# Patient Record
Sex: FEMALE | Race: WHITE | NOT HISPANIC OR LATINO
[De-identification: names, ages, dates, MRNs, and addresses within clinical notes are randomized per-mention and may not be internally consistent; named-entity substitution may affect disease eponyms.]

---

## 2017-08-02 PROBLEM — Z00.00 ENCOUNTER FOR PREVENTIVE HEALTH EXAMINATION: Status: ACTIVE | Noted: 2017-08-02

## 2020-04-26 ENCOUNTER — MESSAGE (OUTPATIENT)
Age: 25
End: 2020-04-26

## 2020-05-23 ENCOUNTER — APPOINTMENT (OUTPATIENT)
Dept: DISASTER EMERGENCY | Facility: CLINIC | Age: 25
End: 2020-05-23

## 2020-05-23 LAB
SARS-COV-2 IGG SERPL IA-ACNC: 0.1 INDEX
SARS-COV-2 IGG SERPL QL IA: NEGATIVE

## 2021-12-07 ENCOUNTER — APPOINTMENT (OUTPATIENT)
Dept: PSYCHIATRY | Facility: CLINIC | Age: 26
End: 2021-12-07
Payer: COMMERCIAL

## 2021-12-07 PROCEDURE — 99205 OFFICE O/P NEW HI 60 MIN: CPT

## 2021-12-08 ENCOUNTER — TRANSCRIPTION ENCOUNTER (OUTPATIENT)
Age: 26
End: 2021-12-08

## 2022-02-22 ENCOUNTER — APPOINTMENT (OUTPATIENT)
Dept: PSYCHIATRY | Facility: CLINIC | Age: 27
End: 2022-02-22
Payer: COMMERCIAL

## 2022-02-22 PROCEDURE — 99214 OFFICE O/P EST MOD 30 MIN: CPT

## 2022-03-08 ENCOUNTER — APPOINTMENT (OUTPATIENT)
Dept: PSYCHIATRY | Facility: CLINIC | Age: 27
End: 2022-03-08
Payer: COMMERCIAL

## 2022-03-08 PROCEDURE — 99214 OFFICE O/P EST MOD 30 MIN: CPT

## 2022-06-07 ENCOUNTER — APPOINTMENT (OUTPATIENT)
Dept: PSYCHIATRY | Facility: CLINIC | Age: 27
End: 2022-06-07
Payer: COMMERCIAL

## 2022-06-08 ENCOUNTER — APPOINTMENT (OUTPATIENT)
Dept: PSYCHIATRY | Facility: CLINIC | Age: 27
End: 2022-06-08
Payer: COMMERCIAL

## 2022-06-08 PROCEDURE — 99214 OFFICE O/P EST MOD 30 MIN: CPT | Mod: 95

## 2022-09-13 ENCOUNTER — APPOINTMENT (OUTPATIENT)
Dept: PSYCHIATRY | Facility: CLINIC | Age: 27
End: 2022-09-13

## 2022-09-13 DIAGNOSIS — F90.9 ATTENTION-DEFICIT HYPERACTIVITY DISORDER, UNSPECIFIED TYPE: ICD-10-CM

## 2022-09-13 DIAGNOSIS — F41.0 PANIC DISORDER [EPISODIC PAROXYSMAL ANXIETY]: ICD-10-CM

## 2022-09-13 DIAGNOSIS — F41.1 GENERALIZED ANXIETY DISORDER: ICD-10-CM

## 2022-09-13 PROCEDURE — 99214 OFFICE O/P EST MOD 30 MIN: CPT | Mod: 95

## 2022-11-15 ENCOUNTER — APPOINTMENT (OUTPATIENT)
Dept: PSYCHIATRY | Facility: CLINIC | Age: 27
End: 2022-11-15

## 2023-02-02 ENCOUNTER — APPOINTMENT (OUTPATIENT)
Dept: PSYCHIATRY | Facility: CLINIC | Age: 28
End: 2023-02-02

## 2023-09-12 ENCOUNTER — APPOINTMENT (OUTPATIENT)
Dept: RHEUMATOLOGY | Facility: CLINIC | Age: 28
End: 2023-09-12
Payer: COMMERCIAL

## 2023-09-12 VITALS
WEIGHT: 144 LBS | OXYGEN SATURATION: 98 % | HEIGHT: 64 IN | HEART RATE: 73 BPM | SYSTOLIC BLOOD PRESSURE: 98 MMHG | DIASTOLIC BLOOD PRESSURE: 60 MMHG | BODY MASS INDEX: 24.59 KG/M2 | TEMPERATURE: 97.2 F

## 2023-09-12 DIAGNOSIS — M79.10 MYALGIA, UNSPECIFIED SITE: ICD-10-CM

## 2023-09-12 PROCEDURE — 99204 OFFICE O/P NEW MOD 45 MIN: CPT

## 2023-09-12 RX ORDER — SERTRALINE HYDROCHLORIDE 100 MG/1
100 TABLET, FILM COATED ORAL
Qty: 30 | Refills: 2 | Status: DISCONTINUED | COMMUNITY
Start: 2021-12-07 | End: 2023-09-12

## 2023-09-12 RX ORDER — TIZANIDINE 4 MG/1
4 TABLET ORAL
Qty: 30 | Refills: 0 | Status: ACTIVE | COMMUNITY
Start: 2023-09-12 | End: 1900-01-01

## 2023-11-08 ENCOUNTER — APPOINTMENT (OUTPATIENT)
Dept: RHEUMATOLOGY | Facility: CLINIC | Age: 28
End: 2023-11-08

## 2023-11-27 ENCOUNTER — APPOINTMENT (OUTPATIENT)
Dept: PSYCHIATRY | Facility: CLINIC | Age: 28
End: 2023-11-27
Payer: COMMERCIAL

## 2023-11-27 PROCEDURE — 99215 OFFICE O/P EST HI 40 MIN: CPT

## 2023-11-27 RX ORDER — DEXTROAMPHETAMINE SACCHARATE, AMPHETAMINE ASPARTATE, DEXTROAMPHETAMINE SULFATE AND AMPHETAMINE SULFATE 2.5; 2.5; 2.5; 2.5 MG/1; MG/1; MG/1; MG/1
10 TABLET ORAL
Qty: 30 | Refills: 0 | Status: COMPLETED | COMMUNITY
Start: 2022-03-08 | End: 2023-11-27

## 2023-11-27 RX ORDER — ESCITALOPRAM OXALATE 5 MG/1
5 TABLET ORAL DAILY
Qty: 5 | Refills: 0 | Status: ACTIVE | COMMUNITY
Start: 2023-11-27 | End: 1900-01-01

## 2023-11-27 RX ORDER — CLONAZEPAM 0.5 MG/1
0.5 TABLET ORAL DAILY
Qty: 15 | Refills: 0 | Status: COMPLETED | COMMUNITY
Start: 2021-12-07 | End: 2023-11-27

## 2023-11-27 RX ORDER — ESCITALOPRAM OXALATE 10 MG/1
10 TABLET ORAL
Qty: 30 | Refills: 0 | Status: ACTIVE | COMMUNITY
Start: 2023-11-27 | End: 1900-01-01

## 2023-12-20 ENCOUNTER — APPOINTMENT (OUTPATIENT)
Dept: PSYCHIATRY | Facility: CLINIC | Age: 28
End: 2023-12-20

## 2024-02-07 ENCOUNTER — APPOINTMENT (OUTPATIENT)
Dept: PSYCHIATRY | Facility: CLINIC | Age: 29
End: 2024-02-07
Payer: COMMERCIAL

## 2024-02-07 PROCEDURE — 99214 OFFICE O/P EST MOD 30 MIN: CPT

## 2024-02-07 NOTE — SOCIAL HISTORY
[With Family] : lives with family [Employed] : employed [College] : College [None] : none [FreeTextEntry3] : Single [FreeTextEntry1] : Social History: \par  Born and raised: in [place], [place] She is the oldest of 2 sibling\par  Siblings: 1 younger brother and one younger sister\par  Parents: were good providers, , Intact family\par  Childhood: was good\par  Education: She remembers  being on academic probation in middle school. She was very involved in school sports and could not play sports at the time. In HS she remembers being very restless, could not sit in one place for a long time. "I would get up saying I wanted to go to the bathroom." Pt state she could not sit long in one place.  Her parents thought she was lazy and did not want to study. This made her very frustrated. Teachers complained but did not ask to see a doctor.  She could not finish test on time and asked for extra time but was not given. She was happy when she was outside playing sports. . 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 He has been very caring and supportive. \par  \par  Legal Hx: Denies\par  	\par  Access to firearms: Denies\par

## 2024-02-07 NOTE — DISCUSSION/SUMMARY
[FreeTextEntry1] : Assessment: Patient is a 27 yo female with h/o SHAWANDA, and ADHD seen today for medication management. Patient is compliant with the medications, tolerating it well without any side effects. I-STOP was checked without any problems.   PLAN: D/C Lexapro 5 mg for 5 days and then increase it to 10 mg PO QAM for depression and anxiety Continue Adderall XR 10 mg for ADHD Start Adderall 5 mg IR PO QD as a booster - Discussed risks and benefits of medications including side effects of GI and sexual with SSRI. Alternative strategies including no intervention discussed with patient. Patient consents to current medications as prescribed. - Discussed with patient regarding importance of abstinence and sobriety from alcohol and drugs. Educated about relationship between worsening mood/anxiety symptoms and drug use and improvement of symptoms with abstinence.  - Discussed about unpredictable effects including cardiorespiratory collapse from the combination of illicit drugs and prescribed medications. Patient verbalized understanding. - Patient understands to contact clinic prn with concerns and agrees to call 911 or go to nearest ER if symptoms worsen. - Next appointment was made by the patient in 6-8 weeks Patient was not in any distress.   I spent a total of 30 minutes for today's visit in evaluating and treating the patient as per above, including: preparing for patient's appointment (review of prior documents, [facility] ), performing a medically necessary exam/evaluation, communicating/counseling/educating the patient ordering medications

## 2024-02-07 NOTE — PHYSICAL EXAM
[FreeTextEntry5] : fidgety  [de-identified] : poor [FreeTextEntry8] : ok [None] : none [Cooperative] : cooperative [Anxious] : anxious [Flat] : flat [Clear] : clear [Linear/Goal Directed] : linear/goal directed [Average] : average [WNL] : within normal limits

## 2024-02-07 NOTE — FAMILY HISTORY
[FreeTextEntry1] : Family History:\par  \par  Psychiatric: None\par  Substance use: None\par  Suicide: None\par  Neurological/medical: None\par  Autoimmune disorders: None\par  Dementia: None\par  Cancer: None\par  Metabolic: None\par  Epilepsy: None\par  Migraine: None\par  Brain aneurysms: None\par  Movement disorders: None\par  Thyroid: Mom had thyroid CA\par  Cardiac: None\par

## 2024-02-07 NOTE — PAST MEDICAL HISTORY
[FreeTextEntry1] : Past Psyc Hx: Pt  neurologist started her on Zoloft for anxiety last October. Her irritability has improved, continues to feel anxious. Pt was Rxed to be on 75 mg but she is taking only 50 mg with some positive effect on anxiety\par  Past Psych Hospitalization:None\par  Psychotherapy: None\par  Suicide attempt: Denies\par  SIB: Denies\par  \par  Allergies: NKA \par  \par  Major Medical Problems:  None\par  Prescription medications: Zoloft\par  OTC medications: None\par  TBI: Had concussion in  at age 18 she bumped her head on the umpire while playing football. Denies LOC or black out\par  Seizures: None\par  Migraine HA: None \par  \par  Surgery: None\par  \par  Substance Use History:\par  \par  Nicotine: Denies \par  Alcohol: Socially, last drink was a month ago. \par  CAGE Questionnaire : negative\par  Illicit drugs: Denies\par  Withdrawal: n/a\par  Hospitalizations: n/a\par  Rehab tx: n/a\par  \par  Developmental History\par  \par  Pre/ problems: Unremarkable\par  Developmental milestones: Unremarkable\par  \par  \par  OBGYN Hx:\par  Menstrual cycle: Menarche:12  , LMP: 1 month ago\par  \par  Pregnancies/Miscarriage: None\par  \par  Menopause: n/a\par  \par

## 2024-02-07 NOTE — HISTORY OF PRESENT ILLNESS
[de-identified] :  Patient is here in the office for face to face interview with writer for 1 month follow-up visit.  Last month Lexapro 10, Adderall XR 10 mg was started on the patient. States she never took the Lexapro and took the Adderall instead. States it has helped her. [place] like she didn't have any anxiety and in fact felt the anxiety was related to lack of focus.   Takes the Adderall XR 10 mg at 8:30am and it lasts her till 1pm. States at 1pm she gets super tired, not motivated. Feels like she needs a booster. +Drug holidays.   Mood: stable. Denies any depression or anxiety Sleepin-7 hours per night Appetite: good  Energy is better Concentration and motivation are better  [No] : no [de-identified] : Intake : /21: 25 y/o seen today for intake and initial evaluation. 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 Her irritability has improved, continues to feel anxious. Pt was Rxed to be on 75 mg but she is taking only 50 mg with some positive effect on anxiety. She was a below average student She remembers  being on academic probation in middle school. She was very involved in school sports and could not play sports at the time. In HS she remembers being very restless, could not sit in one place for a long time. "I would get up saying I wanted to go to the bathroom."  but she did not go to the bathroom she could not just sit still for a long time in one place. "I would always get into trouble for not being seated.   Her parents thought she was lazy and did not want to study. This made her very frustrated. Teachers complained but did not ask to see a doctor.  She could not finish test on time and asked for extra time but was not given.  She was happy when she was outside playing sports. since 2020 pt has been working from home , she is not able to cope. Tries to do laundry, her nails and work at the same time. She will find herself staring at the computer not able to concentrate. She feels she is sitting and doing work for a long time but it has been only 10 minutes since she started to work. Pt is restless, feels overwhelmed when in office her co workers would nudge her do her work but at home she has been slacking. Her supervisor pulled back 4 months of her work record and told she has not been productive and not doing enough work. She thinks I am doing online shopping. Pt notes after starting on Zoloft she felt her edgy feeling improved initially  and has become somewhat calmer. Her dog  recently and was surprised she handled it well. She continues to feel overwhelmed and that is when she cannot sit still and looks around to do other things or walks around the house." I have become very indecisive." She has always been the nervous type small things worry her easily, more recently she has been having panic like symptoms. Denies sx consistent with depression. Denies manic or hypomanic symptoms. She endorses shopping sprees where she will buy things in an impulse e and then will return everything. Her sleep is average gets about 5-6 hours of sleep. Appetite is ok. Denies SI/HI or psychosis.

## 2024-03-06 ENCOUNTER — APPOINTMENT (OUTPATIENT)
Dept: PSYCHIATRY | Facility: CLINIC | Age: 29
End: 2024-03-06
Payer: COMMERCIAL

## 2024-03-06 PROCEDURE — 99214 OFFICE O/P EST MOD 30 MIN: CPT | Mod: 95

## 2024-03-06 NOTE — SOCIAL HISTORY
[With Family] : lives with family [Employed] : employed [None] : none [College] : College [FreeTextEntry3] : Single [FreeTextEntry1] : Social History: \par  Born and raised: in [place], [place] She is the oldest of 2 sibling\par  Siblings: 1 younger brother and one younger sister\par  Parents: were good providers, , [employer] family\par  Childhood: was good\par  Education: She remembers  being on academic probation in middle school. She was very involved in school sports and could not play sports at the time. In HS she remembers being very restless, could not sit in one place for a long time. "I would get up saying I wanted to go to the bathroom." Pt state she could not sit long in one place.  Her parents thought she was lazy and did not want to study. This made her very frustrated. Teachers complained but did not ask to see a doctor.  She could not finish test on time and asked for extra time but was not given. She was happy when she was outside playing sports. . After HS went to 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 He has been very caring and supportive. \par  \par  Legal Hx: Denies\par  	\par  Access to firearms: Denies\par

## 2024-03-06 NOTE — HISTORY OF PRESENT ILLNESS
[de-identified] : Patient is here in the office for face to face interview with writer for 1 month follow-up visit.  Last month Adderall IR 5 mg was started on the patient. States she likes the IR 5 mg but feels increasing it to 10 mg will help her. States the 5 mg does not last as long ad it does not help me when I need it.   Mood: stable. Denies any depression or anxiety Sleepin-7 hours per night Appetite: good  Energy is better Concentration and motivation are better  Denies any AVH, SI or HI.  +Drug Holiday.  [No] : no [de-identified] : Intake : /23: 25 y/o seen today for intake and initial evaluation. 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 Her irritability has improved, continues to feel anxious. Pt was Rxed to be on 75 mg but she is taking only 50 mg with some positive effect on anxiety. She was a below average student She remembers  being on academic probation in middle school. She was very involved in school sports and could not play sports at the time. In HS she remembers being very restless, could not sit in one place for a long time. "I would get up saying I wanted to go to the bathroom."  but she did not go to the bathroom she could not just sit still for a long time in one place. "I would always get into trouble for not being seated.   Her parents thought she was lazy and did not want to study. This made her very frustrated. Teachers complained but did not ask to see a doctor.  She could not finish test on time and asked for extra time but was not given.  She was happy when she was outside playing sports. since 2020 pt has been working from home , she is not able to cope. Tries to do laundry, her nails and work at the same time. She will find herself staring at the computer not able to concentrate. She feels she is sitting and doing work for a long time but it has been only 10 minutes since she started to work. Pt is restless, feels overwhelmed when in office her co workers would nudge her do her work but at home she has been slacking. Her supervisor pulled back 4 months of her work record and told she has not been productive and not doing enough work. She thinks I am doing online shopping. Pt notes after starting on Zoloft she felt her edgy feeling improved initially  and has become somewhat calmer. Her dog  recently and was surprised she handled it well. She continues to feel overwhelmed and that is when she cannot sit still and looks around to do other things or walks around the house." I have become very indecisive." She has always been the nervous type small things worry her easily, more recently she has been having panic like symptoms. Denies sx consistent with depression. Denies manic or hypomanic symptoms. She endorses shopping sprees where she will buy things in an impulse e and then will return everything. Her sleep is average gets about 5-6 hours of sleep. Appetite is ok. Denies SI/HI or psychosis.

## 2024-03-06 NOTE — PHYSICAL EXAM
[FreeTextEntry5] : fidgety  [FreeTextEntry8] : ok [de-identified] : poor [None] : none [Cooperative] : cooperative [Anxious] : anxious [Flat] : flat [Clear] : clear [Linear/Goal Directed] : linear/goal directed [Average] : average [WNL] : within normal limits

## 2024-03-06 NOTE — DISCUSSION/SUMMARY
[FreeTextEntry1] : Assessment: Patient is a 27 yo female with h/o SHAWANDA, and ADHD seen today for medication management. Patient is compliant with the medications, tolerating it well without any side effects. I-STOP was checked without any problems.   PLAN: D/C Lexapro 5 mg for 5 days and then increase it to 10 mg PO QAM for depression and anxiety Continue Adderall XR 10 mg for ADHD Increase Adderall 5 to 10 mg IR PO QD as a booster - Discussed risks and benefits of medications including side effects of GI and sexual with SSRI. Alternative strategies including no intervention discussed with patient. Patient consents to current medications as prescribed. - Discussed with patient regarding importance of abstinence and sobriety from alcohol and drugs. Educated about relationship between worsening mood/anxiety symptoms and drug use and improvement of symptoms with abstinence.  - Discussed about unpredictable effects including cardiorespiratory collapse from the combination of illicit drugs and prescribed medications. Patient verbalized understanding. - Patient understands to contact clinic prn with concerns and agrees to call 911 or go to nearest ER if symptoms worsen. - Next appointment was made by the patient in 6-8 weeks Patient was not in any distress.   I spent a total of 30 minutes for today's visit in evaluating and treating the patient as per above, including: preparing for patient's appointment (review of prior documents, [facility] ), performing a medically necessary exam/evaluation, communicating/counseling/educating the patient ordering medications

## 2024-05-08 ENCOUNTER — APPOINTMENT (OUTPATIENT)
Dept: PSYCHIATRY | Facility: CLINIC | Age: 29
End: 2024-05-08
Payer: COMMERCIAL

## 2024-05-08 PROCEDURE — 99214 OFFICE O/P EST MOD 30 MIN: CPT | Mod: 95

## 2024-05-08 NOTE — SOCIAL HISTORY
[With Family] : lives with family [Employed] : employed [College] : College [None] : none [FreeTextEntry3] : Single [FreeTextEntry1] : Social History: \par  Born and raised: in [place], [place] She is the oldest of 2 sibling\par  Siblings: 1 younger brother and one younger sister\par  Parents: were good providers, , Intact family\par  Childhood: was good\par  Education: She remembers  being on academic probation in middle school. She was very involved in school sports and could not play sports at the time. In HS she remembers being very restless, could not sit in one place for a long time. "I would get up saying I wanted to go to the bathroom." Pt state she could not sit long in one place.  Her parents thought she was lazy and did not want to study. This made her very frustrated. Teachers complained but did not ask to see a doctor.  She could not finish test on time and asked for extra time but was not given. She was happy when she was outside playing sports. . 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 He has been very caring and supportive. \par  \par  Legal Hx: Denies\par  	\par  Access to firearms: Denies\par

## 2024-05-08 NOTE — DISCUSSION/SUMMARY
[FreeTextEntry1] : Assessment: Patient is a 29 yo female with h/o SHAWANDA, and ADHD seen today for medication management. Patient is compliant with the medications, tolerating it well without any side effects. I-STOP was checked without any problems.   PLAN: D/C Lexapro 5 mg for 5 days and then increase it to 10 mg PO QAM for depression and anxiety Continue Adderall XR 10 mg for ADHD Continue Adderall 10 mg IR PO QD as a booster - Discussed risks and benefits of medications including side effects of GI and sexual with SSRI. Alternative strategies including no intervention discussed with patient. Patient consents to current medications as prescribed. - Discussed with patient regarding importance of abstinence and sobriety from alcohol and drugs. Educated about relationship between worsening mood/anxiety symptoms and drug use and improvement of symptoms with abstinence.  - Discussed about unpredictable effects including cardiorespiratory collapse from the combination of illicit drugs and prescribed medications. Patient verbalized understanding. - Patient understands to contact clinic prn with concerns and agrees to call 911 or go to nearest ER if symptoms worsen. - Next appointment was made by the patient in 3 months Patient was not in any distress.

## 2024-05-08 NOTE — PHYSICAL EXAM
[FreeTextEntry5] : fidgety  [FreeTextEntry8] : ok [de-identified] : poor [None] : none [Cooperative] : cooperative [Anxious] : anxious [Flat] : flat [Clear] : clear [Linear/Goal Directed] : linear/goal directed [Average] : average [WNL] : within normal limits

## 2024-05-08 NOTE — HISTORY OF PRESENT ILLNESS
[de-identified] : Patient is here in the office for face to face interview with writer for 1 month follow-up visit.  Last month Adderall IR 5 mg was increased to 10 mg. States it is better and likes it.    Mood: stable. Denies any depression or anxiety Sleepin-7 hours per night Appetite: good  Energy is better Concentration and motivation are better  Denies any AVH, SI or HI.  +Drug Holiday.  [No] : no [de-identified] : Intake : /21: 27 y/o seen today for intake and initial evaluation. 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 Her irritability has improved, continues to feel anxious. Pt was Rxed to be on 75 mg but she is taking only 50 mg with some positive effect on anxiety. She was a below average student She remembers  being on academic probation in middle school. She was very involved in school sports and could not play sports at the time. In HS she remembers being very restless, could not sit in one place for a long time. "I would get up saying I wanted to go to the bathroom."  but she did not go to the bathroom she could not just sit still for a long time in one place. "I would always get into trouble for not being seated.   Her parents thought she was lazy and did not want to study. This made her very frustrated. Teachers complained but did not ask to see a doctor.  She could not finish test on time and asked for extra time but was not given.  She was happy when she was outside playing sports. since 2020 pt has been working from home , she is not able to cope. Tries to do laundry, her nails and work at the same time. She will find herself staring at the computer not able to concentrate. She feels she is sitting and doing work for a long time but it has been only 10 minutes since she started to work. Pt is restless, feels overwhelmed when in office her co workers would nudge her do her work but at home she has been slacking. Her supervisor pulled back 4 months of her work record and told she has not been productive and not doing enough work. She thinks I am doing online shopping. Pt notes after starting on Zoloft she felt her edgy feeling improved initially  and has become somewhat calmer. Her dog  recently and was surprised she handled it well. She continues to feel overwhelmed and that is when she cannot sit still and looks around to do other things or walks around the house." I have become very indecisive." She has always been the nervous type small things worry her easily, more recently she has been having panic like symptoms. Denies sx consistent with depression. Denies manic or hypomanic symptoms. She endorses shopping sprees where she will buy things in an impulse e and then will return everything. Her sleep is average gets about 5-6 hours of sleep. Appetite is ok. Denies SI/HI or psychosis.

## 2024-07-30 ENCOUNTER — APPOINTMENT (OUTPATIENT)
Dept: PSYCHIATRY | Facility: CLINIC | Age: 29
End: 2024-07-30
Payer: COMMERCIAL

## 2024-07-30 PROCEDURE — 99214 OFFICE O/P EST MOD 30 MIN: CPT | Mod: 95

## 2024-07-30 NOTE — DISCUSSION/SUMMARY
[FreeTextEntry1] : Assessment: Patient is a 29 yo female with h/o SHAWANDA, and ADHD seen today for medication management. Patient is compliant with the medications, tolerating it well without any side effects. I-STOP was checked without any problems.   PLAN: D/C Lexapro 5 mg for 5 days and then increase it to 10 mg PO QAM for depression and anxiety Continue Adderall XR 10 to 20 mg for ADHD Continue Adderall 10 mg IR PO QD as a booster - Discussed risks and benefits of medications including side effects of GI and sexual with SSRI. Alternative strategies including no intervention discussed with patient. Patient consents to current medications as prescribed. - Discussed with patient regarding importance of abstinence and sobriety from alcohol and drugs. Educated about relationship between worsening mood/anxiety symptoms and drug use and improvement of symptoms with abstinence.  - Discussed about unpredictable effects including cardiorespiratory collapse from the combination of illicit drugs and prescribed medications. Patient verbalized understanding. - Patient understands to contact clinic prn with concerns and agrees to call 911 or go to nearest ER if symptoms worsen. - Next appointment was made by the patient in 1 month Patient was not in any distress.

## 2024-07-30 NOTE — SOCIAL HISTORY
[With Family] : lives with family [Employed] : employed [College] : College [None] : none [FreeTextEntry3] : Single [FreeTextEntry1] : Social History: \par  Born and raised: in [place], [place] She is the oldest of 2 sibling\par  Siblings: 1 younger brother and one younger sister\par  Parents: were good providers, , Intact family\par  Childhood: was good\par  Education: She remembers  being on academic probation in middle school. She was very involved in school sports and could not play sports at the time. In HS she remembers being very restless, could not sit in one place for a long time. "I would get up saying I wanted to go to the bathroom." Pt state she could not sit long in one place.  Her parents thought she was lazy and did not want to study. This made her very frustrated. Teachers complained but did not ask to see a doctor.  She could not finish test on time and asked for extra time but was not given. She was happy when she was outside playing sports. . 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 He has been very caring and supportive. \par  \par  Legal Hx: Denies\par  	\par  Access to firearms: Denies\par

## 2024-07-30 NOTE — PHYSICAL EXAM
[FreeTextEntry5] : fidgety  [FreeTextEntry8] : ok [de-identified] : poor [None] : none [Cooperative] : cooperative [Anxious] : anxious [Flat] : flat [Clear] : clear [Linear/Goal Directed] : linear/goal directed [Average] : average [WNL] : within normal limits

## 2024-07-30 NOTE — HISTORY OF PRESENT ILLNESS
[de-identified] : The following service was provided using telehealth between writer/provider and patient. The patient was at home. The writer was at clinic. Patient was alone and consented to telehealth format. All treatment plans through and including today's date were reviewed with the patient.  Patient is here for 3 month follow-up visit via telehealth.  No medication changes at that time. Patient was in inquiring if the Adderall ER can be increased from 10 to 20 mg,. States she tried 20 mg and felt that worked better.   Mood: stable. Denies any depression or anxiety Sleepin-7 hours per night. 10-6pm Appetite: good  Energy is better Concentration and motivation could be better.   Denies any AVH, SI or HI.  +Drug Holiday.  [No] : no [de-identified] : Intake : /21: 27 y/o seen today for intake and initial evaluation. 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 Her irritability has improved, continues to feel anxious. Pt was Rxed to be on 75 mg but she is taking only 50 mg with some positive effect on anxiety. She was a below average student She remembers  being on academic probation in middle school. She was very involved in school sports and could not play sports at the time. In HS she remembers being very restless, could not sit in one place for a long time. "I would get up saying I wanted to go to the bathroom."  but she did not go to the bathroom she could not just sit still for a long time in one place. "I would always get into trouble for not being seated.   Her parents thought she was lazy and did not want to study. This made her very frustrated. Teachers complained but did not ask to see a doctor.  She could not finish test on time and asked for extra time but was not given.  She was happy when she was outside playing sports. since 2020 pt has been working from home , she is not able to cope. Tries to do laundry, her nails and work at the same time. She will find herself staring at the computer not able to concentrate. She feels she is sitting and doing work for a long time but it has been only 10 minutes since she started to work. Pt is restless, feels overwhelmed when in office her co workers would nudge her do her work but at home she has been slacking. Her supervisor pulled back 4 months of her work record and told she has not been productive and not doing enough work. She thinks I am doing online shopping. Pt notes after starting on Zoloft she felt her edgy feeling improved initially  and has become somewhat calmer. Her dog  recently and was surprised she handled it well. She continues to feel overwhelmed and that is when she cannot sit still and looks around to do other things or walks around the house." I have become very indecisive." She has always been the nervous type small things worry her easily, more recently she has been having panic like symptoms. Denies sx consistent with depression. Denies manic or hypomanic symptoms. She endorses shopping sprees where she will buy things in an impulse e and then will return everything. Her sleep is average gets about 5-6 hours of sleep. Appetite is ok. Denies SI/HI or psychosis.

## 2024-08-09 ENCOUNTER — NON-APPOINTMENT (OUTPATIENT)
Age: 29
End: 2024-08-09

## 2024-08-09 NOTE — DISCUSSION/SUMMARY
[FreeTextEntry1] : Covering provider was requested to send a prescription for Adderall ER 20 mg to be changed to 10 mgx2/day due to stock issues.  Chart reviewed.  Noted that patient saw her primary psychiatrist on July 30 at which time prescription for Adderall extended release 10 mg was increased to 20 mg daily. Request appropriate, will send Rx I stop checked today: Reference #: [id number]  Practitioner Count: 1 Pharmacy Count: 1 Current Opioid Prescriptions: 0 Current Benzodiazepine Prescriptions: 0 Current Stimulant Prescriptions: 1   Patient Demographic Information (PDI)     PDI	First Name	Last Name	Birth Date	Gender	Street Address	[facility]	Zip Code [place]	1995	Female	[address]	[place] [facility]	[id number]  Prescription Information    PDI Filter:   PDI	My Rx	Current Rx	Drug Type	Rx Written	Rx Dispensed	Drug	Quantity	Days Supply	Prescriber Name	Prescriber EUGENIE #	Payment Method	Dispenser A	N	Y	S	07/30/2024	08/01/2024	dextroamp-amphetamin 10 mg tab	30	30	Amaris Foss MD	[id number]	[facility] #[id number] A	N	N	S	07/29/2024	07/30/2024	dextroamp-amphetamin 10 mg tab	1	1	Amaris Foss MD	[id number]	Insurance	[facility] #[id number] A	N	N	S	07/29/2024	07/30/2024	dextroamp-amphet er 10 mg cap	1	1	Amaris Foss MD	[id number]	Insurance	[facility] #[id number] A	N	N	S	06/28/2024	06/28/2024	dextroamp-amphet er 10 mg cap	30	30	Amaris Foss MD	[id number]	Insurance	[facility] #[id number] A	N	N	S	06/28/2024	06/28/2024	dextroamp-amphetamin 10 mg tab	30	30	Amaris Foss MD	[id number]	Insurance	[facility] #[id number] A	N	N	S	05/28/2024	05/31/2024	dextroamp-amphet er 10 mg cap	30	30	Amaris Foss MD	[id number]	Insurance	[facility] #[id number] A	N	N	S	05/28/2024	05/31/2024	dextroamp-amphetamin 10 mg tab	30	30	Amaris Foss MD	[id number]	[facility] #[id number] A	N	N	S	04/25/2024	04/25/2024	dextroamp-amphetamin 10 mg tab	30	30	Treitel, Noman	[id number]	Insurance	[facility] #[id number] A	N	N	S	04/25/2024	04/25/2024	dextroamp-amphet er 10 mg cap	30	30	Treitel, Noman	[id number]	Insurance	[facility] #[id number] A	N	N	S	03/06/2024	03/14/2024	dextroamp-amphet er 10 mg cap	30	30	FossAmaris MD	[id number]	Insurance	[facility] #[id number] A	N	N	S	03/06/2024	03/14/2024	dextroamp-amphetamin 10 mg tab	30	30	FossAmaris wilson MD	[id number]	Insurance	[facility] #[id number] A	N	N	S	02/07/2024	02/12/2024	dextroamp-amphet er 10 mg cap	30	30	FossAmaris wilson MD	[id number]	Insurance	[facility] #[id number] A	N	N	S	02/07/2024	02/12/2024	dextroamp-amphetamine 5 mg tab	30	30	FossAmaris MD	[id number]	Insurance	[facility] #[id number] A	N	N	S	01/31/2024	01/31/2024	dextroamp-amphet er 10 mg cap	7	7	FossAmaris wilson MD	[id number]	Insurance	[facility] #[id number] A	N	N	S	11/27/2023	11/30/2023	adderall xr 10 mg capsule	30	30	Amaris Foss MD	[id number]	Insurance	[facility] #[id number] A	N	N	B	10/30/2023	10/31/2023	clonazepam 0.5 mg tablet	7	7	Monica Galindo	[id number]	Insurance	[facility] #[id number] A	N	N	B	10/19/2023	10/23/2023	alprazolam 0.25 mg tablet	10	5	Satya Cervantes MD	[id number]	Insurance	[facility] #[id number] A	N	N	B	08/11/2023	08/11/2023	alprazolam 0.25 mg tablet	20	7	Radha Mcmanus	[id number]	Insurance	[facility] #[id number]

## 2024-10-16 ENCOUNTER — APPOINTMENT (OUTPATIENT)
Dept: PSYCHIATRY | Facility: CLINIC | Age: 29
End: 2024-10-16
Payer: COMMERCIAL

## 2024-10-16 PROCEDURE — 99214 OFFICE O/P EST MOD 30 MIN: CPT | Mod: 95

## 2024-12-16 ENCOUNTER — APPOINTMENT (OUTPATIENT)
Dept: PSYCHIATRY | Facility: CLINIC | Age: 29
End: 2024-12-16
Payer: COMMERCIAL

## 2024-12-16 PROCEDURE — 99214 OFFICE O/P EST MOD 30 MIN: CPT | Mod: 95

## 2025-03-17 ENCOUNTER — APPOINTMENT (OUTPATIENT)
Dept: PSYCHIATRY | Facility: CLINIC | Age: 30
End: 2025-03-17
Payer: COMMERCIAL

## 2025-03-17 PROCEDURE — 99214 OFFICE O/P EST MOD 30 MIN: CPT | Mod: 95

## 2025-04-30 ENCOUNTER — APPOINTMENT (OUTPATIENT)
Dept: PSYCHIATRY | Facility: CLINIC | Age: 30
End: 2025-04-30
Payer: COMMERCIAL

## 2025-04-30 PROCEDURE — 99214 OFFICE O/P EST MOD 30 MIN: CPT | Mod: 95

## 2025-04-30 RX ORDER — ESCITALOPRAM OXALATE 5 MG/1
5 TABLET ORAL DAILY
Qty: 30 | Refills: 1 | Status: ACTIVE | COMMUNITY
Start: 2025-04-30 | End: 1900-01-01

## 2025-06-17 ENCOUNTER — APPOINTMENT (OUTPATIENT)
Dept: PSYCHIATRY | Facility: CLINIC | Age: 30
End: 2025-06-17

## 2025-06-18 ENCOUNTER — APPOINTMENT (OUTPATIENT)
Dept: ORTHOPEDIC SURGERY | Facility: CLINIC | Age: 30
End: 2025-06-18

## 2025-06-18 VITALS — HEIGHT: 64 IN | BODY MASS INDEX: 23.05 KG/M2 | WEIGHT: 135 LBS

## 2025-06-18 PROCEDURE — 99204 OFFICE O/P NEW MOD 45 MIN: CPT

## 2025-06-25 ENCOUNTER — APPOINTMENT (OUTPATIENT)
Dept: ORTHOPEDIC SURGERY | Facility: CLINIC | Age: 30
End: 2025-06-25

## 2025-06-25 PROBLEM — R11.0 CHRONIC NAUSEA: Status: ACTIVE | Noted: 2025-06-25

## 2025-06-25 PROCEDURE — 99213 OFFICE O/P EST LOW 20 MIN: CPT

## 2025-06-25 RX ORDER — ONDANSETRON 4 MG/1
4 TABLET, ORALLY DISINTEGRATING ORAL DAILY
Qty: 14 | Refills: 0 | Status: ACTIVE | COMMUNITY
Start: 2025-06-25 | End: 1900-01-01

## 2025-06-27 PROBLEM — S06.0XAA CONCUSSION WITH UNKNOWN LOSS OF CONSCIOUSNESS STATUS, INITIAL ENCOUNTER: Status: ACTIVE | Noted: 2025-06-18

## 2025-07-21 ENCOUNTER — APPOINTMENT (OUTPATIENT)
Dept: PSYCHIATRY | Facility: CLINIC | Age: 30
End: 2025-07-21

## 2025-08-20 ENCOUNTER — APPOINTMENT (OUTPATIENT)
Dept: PSYCHIATRY | Facility: CLINIC | Age: 30
End: 2025-08-20

## 2025-09-19 ENCOUNTER — APPOINTMENT (OUTPATIENT)
Dept: PSYCHIATRY | Facility: CLINIC | Age: 30
End: 2025-09-19
Payer: COMMERCIAL

## 2025-09-19 PROCEDURE — 99214 OFFICE O/P EST MOD 30 MIN: CPT | Mod: 95

## 2025-09-19 RX ORDER — CLONAZEPAM 0.5 MG/1
0.5 TABLET ORAL DAILY
Qty: 10 | Refills: 0 | Status: ACTIVE | COMMUNITY
Start: 2025-09-19 | End: 1900-01-01